# Patient Record
Sex: FEMALE | Race: WHITE
[De-identification: names, ages, dates, MRNs, and addresses within clinical notes are randomized per-mention and may not be internally consistent; named-entity substitution may affect disease eponyms.]

---

## 2017-02-08 ENCOUNTER — HOSPITAL ENCOUNTER (OUTPATIENT)
Dept: HOSPITAL 80 - BMCIMAGING | Age: 57
End: 2017-02-08
Attending: GENERAL ACUTE CARE HOSPITAL
Payer: COMMERCIAL

## 2017-02-08 DIAGNOSIS — Z12.31: Primary | ICD-10-CM

## 2017-02-08 PROCEDURE — G0202 SCR MAMMO BI INCL CAD: HCPCS

## 2017-02-08 NOTE — MA
Screening Digital Mammogram With iCAD Analysis



Clinical Indications: Routine screening. Her maternal grandmother was diagnosed with breast cancer.



Technique: Standard cephalocaudal and mediolateral oblique projections were obtained. This examinatio
n was processed by the iCAD computer aided detection system.



Comparison: February 2016, February 2015, December 2013, December 2012, December 2011, December 2010,
 December 2009.



Breast density: Type B; Scattered fibroglandular densities.



Findings: CAD was reviewed.  No masses, suspicious calcifications or other signs of malignancy are id
entified.  There has been no significant change in the appearance of either breast. 



Impression: Negative mammogram. BI-RADS 1. 



Recommendation: Routine mammographic screening in one year.



[facility] will send a result letter to the patient.

Negative mammography should not preclude additional workup of a clinically suspicious finding.

The patient's information is entered into a reminder system with a target due date for her next mammo
gram.

## 2018-02-14 ENCOUNTER — HOSPITAL ENCOUNTER (OUTPATIENT)
Dept: HOSPITAL 80 - BMCIMAGING | Age: 58
End: 2018-02-14
Attending: FAMILY MEDICINE
Payer: COMMERCIAL

## 2018-02-14 DIAGNOSIS — Z12.31: Primary | ICD-10-CM

## 2019-02-15 ENCOUNTER — HOSPITAL ENCOUNTER (OUTPATIENT)
Dept: HOSPITAL 80 - BMCIMAGING | Age: 59
End: 2019-02-15
Attending: FAMILY MEDICINE
Payer: COMMERCIAL

## 2019-02-15 DIAGNOSIS — Z80.3: ICD-10-CM

## 2019-02-15 DIAGNOSIS — Z12.31: Primary | ICD-10-CM
